# Patient Record
(demographics unavailable — no encounter records)

---

## 2024-11-12 NOTE — IMAGING
[de-identified] : LEFT KNEE Inspection:  mild effusion, pop cyst Palpation: medial joint line tenderness, anterior tenderness Knee Range of Motion:  3-125  Strength: 5/5 Quadriceps strength, 5/5 Hamstring strength Neurological: light touch is intact throughout Ligament Stability and Special Tests:  McMurrays: neg Lachman: neg Pivot Shift: neg Posterior Drawer: neg Valgus: neg Varus: neg Patella Apprehension: neg Patella Maltracking: neg

## 2024-11-12 NOTE — HISTORY OF PRESENT ILLNESS
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 Has tried icing, Tylenol  11/12/24 - CSI last visit 7/9/24 with temp relief. In PT 1x/week at [facility]  but still pain

## 2024-11-12 NOTE — ASSESSMENT
[FreeTextEntry1] :    - We discussed their diagnosis and treatment options at length including the risks and benefits of both surgical treatment with a knee replacement and non-surgical options. Surgical risks include but are not limited to pain, infection, bleeding, vascular injury, numbness, tingling, nerve damage. - Due to risks of surgery, we will continue conservative treatment with PT, icing, and anti-inflammatory medications - The patient was provided with a PT prescription to work on ROM, hip ER/abductors strengthening, quad/hamstring stretches and strengthening, and other exercises - The patient was advised to let pain guide the gradual advancement of activities. - The patient was advised to apply ice (wrapped in a towel or protective covering) to the area daily (20 minutes at a time, 2-4X/day). - We also discussed the possible of a corticosteroid injection in order to help decrease inflammation and pain so that they can perform better therapy. - The risks, benefits, and alternatives to corticosteroid injection were reviewed with the patient and they wished to proceed with this treatment course. - Follow up as needed in 6 weeks to re-evaluate, if no improvement we spoke about possibility of viscosupplementation injections    Medication Discussion: 1) We discussed a comprehensive treatment plan that included possible pharmaceutical management involving the use of prescription strength medications including but not limited to options such as oral Naprosyn 500mg BID, once daily Meloxicam 15 mg, or 500-650 mg Tylenol versus over the counter oral medications in addition to discussing possible topical prescription Pennsaid vs  Voltaren gel. 2) There is a moderate risk of morbidity with further treatment, especially from use of prescription strength medications and possible side effects of these medications which include but are not limited to upset stomach with oral medications, skin reactions to topical medications and GI/cardiac/renal issues with long term use. 3) I recommended that the patient follow-up with their medical physician if there are any significant potential issues with long term medication use such as interactions with current medications or with exacerbation of underlying medical comorbidities. 4) The benefits and risks associated with use of oral and / or topical prescription and over the counter anti-inflammatory medications were discussed with the patient. The patient voiced understanding of the risks including but not limited to bleeding, stroke, kidney dysfunction, heart disease, and were referred to the black box warning label for further information.

## 2025-04-16 NOTE — REASON FOR VISIT
[CV Risk Factors and Non-Cardiac Disease] : CV risk factors and non-cardiac disease [FreeTextEntry3] : Dr. Chapman [FreeTextEntry1] : This is a 55 year year old male here today for follow up cardiac followup evaluation.  He has a past medical history significant for murmur, history of hypertension, hyperlipidemia.  Today he is feeling generally well and does not have any complaints at this time.  He is here for repeat blood work and blood pressure check and is currently prescribed amlodipine 5 mg, atorvastatin 40 mg, and telmisartan 80 mg.  He denies fever, chills, weight loss, malaise, rash, alteration bowel habits, weakness, abdominal pain, bloating, changes in urination, visual disturbances, chest pain, headaches, dizziness, heart palpitations, recent episodes of syncope or falls at this time.    [Follow-Up - Clinic] : a clinic follow-up of [Dyspnea] : dyspnea [Hyperlipidemia] : hyperlipidemia [Hypertension] : hypertension

## 2025-04-16 NOTE — DISCUSSION/SUMMARY
[FreeTextEntry1] : The patient is a 55-year-old male with past medical history significant for murmur, history of hypertension, hyperlipidemia, comes in for followup cardiac evaluation. The patient denies specific chest pain, chest pressure, dizziness or palpitations.  He may get occasional dyspnea on exertion. Coronary artery calcium score done May 23, 2019 demonstrated a score of 3 units in the left anterior descending artery. The patient had a normal exercise stress test April 16, 2023; he has good aerobic capacity. Lipid panel done September 6, 2024 demonstrated cholesterol 177, HDL 55, triglycerides 94, LDL calculated 105, non-HDL cholesterol 122, LDL direct 111 mg/dL and hemoglobin A1c of 5.8.  He will continue on his current diet and exercise program.  He will have new blood work done today for electrolytes and lipid profile. He stopped amlodipine 10 mg daily on his own.  He remains on telmisartan 80 mg daily.  Hypertension. His blood pressure seems to be under adequate control.  He will continue his current diet and salt limitation.  He will return in 3 months to recheck his blood pressure. The patient had normal exercise stress test February 7, 2023.  New blood work done today for lipid profile.  His blood pressure is under good control.  He remains on Lipitor 40 mg daily.  He is no longer taking Zetia therapy. He continues on his Micardis 80 mg daily and Norvasc 5 mg/day for his hypertension. Lifestyle modification was reinforced. I encouraged him to increase his aerobic activity and to work with our registered dietitian. Echo Doppler examination done February 17, 2021 demonstrated minimal mitral valve regurgitation, minimal tricuspid valve regurgitation, physiologic pulmonic valve regurgitation, normal left ventricular ejection fraction. Electrocardiogram done November 17, 2021 demonstrate normal sinus rhythm rate 60 bpm is otherwise unremarkable. The patient had a normal exercise stress test done February 17, 2021. Electrocardiogram done May 27, 2020 demonstrated normal sinus rhythm rate of 68 bpm is otherwise unremarkable. Coronary calcium score May 23rd 2019.  This demonstrated 3 units in the left anterior descending artery and his other arteries were zero.  The patient had a normal exercise stress test 12/12/2018.  The patient understands that aerobic exercises must be increased to 40 minutes 4 times per week. A detailed discussion of lifestyle modification was done today. The patient has a good understanding of the diagnosis, and treatment plan. Lifestyle modification was also outlined.

## 2025-04-16 NOTE — ASSESSMENT
[FreeTextEntry1] : Prior note nurse practitioner Roberto September 17, 2024 this is a 54-year-old male here today for follow up cardiac follow-up evaluation.  He has a past medical history significant for murmur, history of hypertension, hyperlipidemia.  HPI: He is feeling generally well today and denies chest pain, dizziness, heart palpitations, recent episodes of syncope or falls, SOB, or dyspnea at this time.  Current Medications: Amlodipine 10 mg daily, Atorvastatin 40 mg daily, and Telmisartan 80 mg daily.   Patient reports he hand his wife have joined [employer] and work out 3x/week.   Depression screening completed. Discussed negative results with patient during visit. Patient will follow-up with their primary care provider for mental health management if needed in the future.  BLOOD PRESSURE: -Controlled.   -I have discussed the importance of maintaining good BP control and reviewed the newest guidelines with the patient while re-enforcing dietary sodium restrictions to no more than 2-3 g daily, DASH diet, life style modifications as well as the goal of maintaining ideal body weight with the patient today. I have advised the patient to avoid the use of over-the-counter medications/ supplements especially NSAIDS.   BLOOD WORK: -Lipid panel done September 2024 demonstrated triglyceride 94, cholesterol 177, HDL 55, , non-, LDL direct 111. -Lipid panel done November 2023 demonstrated triglyceride 178, cholesterol 206, HDL 60, , non-, LDL direct 119.  -Lipid panel done February 2023 demonstrated triglyceride 406, cholesterol 223, HDL 47, Non-, LDL-direct 129 -Lipid panel done July 2022 demonstrated triglyceride 329, cholesterol 179, HDL 43, LDL 99, Non-, LDL-direct 71  TESTING/REPORTS: -EKG done 09/17/2024 demonstrated regular sinus rhythm rate 61 bpm otherwise unremarkable.   -EKG done November 2023 demonstrated sinus bradycardia heart rate 44 BPM.  *Patient denies dizziness and heart rate reassessment via SpO2 shows rate of 65 BPM*   -Echocardiogram done October 2023 demonstrated ventricular systolic function ejection fraction 64%, minimal thickening of the aortic valve leaflets, physiologic mitral regurgitation, physiologic tricuspid regurgitation, physiologic pulmonic regurgitation.  -Bilateral carotid artery Doppler in October 2023 demonstrated proximal ICA with no stenosis bilaterally.  -The patient had normal exercise stress test February 7, 2023.  -EKG done Jul 05, 2022 which demonstrated regular sinus rhythm with nonspecific ST-T wave changes, BPM of 51.  -The patient had a normal exercise stress test done February 17, 2021.  -The patient had coronary calcium score May 23rd 2019. This demonstrated 3 units in the left anterior descending artery and his other arteries were zero.  -Echocardiogram done on 2/17/2021 demonstrated mild mitral tricuspid and pulmonic regurgitation with normal left ventricular systolic function. EF of 65%.   PLAN: -He will continue with his usual medications and will contact the office if he is having any complaints between now and their next follow up appointment. -He will schedule an exercise stress test to evaluate for significant coronary artery disease. -He will follow-up with his PCP routinely.   I have discussed the plan of care with . BRENDEN BURNETT and he will follow up in 4-6 months. He is compliant with all of his medications.  The patient understands that aerobic exercises must be increased to minutes 4 times/week and a detailed discussion of lifestyle modification was done today.  The patient has a good understanding of the diagnosis, treatment plan and lifestyle modification.  He will contact me at the office for any questions with their care or any changes in their health status.  Dr Thornton, supervising physician, was present in the office with DEMETRICE Mejia NP during the horn portions of the history and exam. The plan was discussed in detail as documented in the note.  DEMETRICE Mejia NP

## 2025-04-16 NOTE — PHYSICAL EXAM
[Well Developed] : well developed [Well Nourished] : well nourished [No Acute Distress] : no acute distress [Normal Venous Pressure] : normal venous pressure [No Carotid Bruit] : no carotid bruit [Normal S1, S2] : normal S1, S2 [No Rub] : no rub [Apical Thrill] : no thrill palpable at the apex [Clear Lung Fields] : clear lung fields [Good Air Entry] : good air entry [No Respiratory Distress] : no respiratory distress  [Soft] : abdomen soft [Non Tender] : non-tender [No Masses/organomegaly] : no masses/organomegaly [Normal Bowel Sounds] : normal bowel sounds [Normal Gait] : normal gait [No Edema] : no edema [No Cyanosis] : no cyanosis [No Clubbing] : no clubbing [No Varicosities] : no varicosities [No Rash] : no rash [No Skin Lesions] : no skin lesions [Moves all extremities] : moves all extremities [No Focal Deficits] : no focal deficits [Normal Speech] : normal speech [Alert and Oriented] : alert and oriented [Normal memory] : normal memory [General Appearance - Well Developed] : well developed [Normal Appearance] : normal appearance [Well Groomed] : well groomed [General Appearance - Well Nourished] : well nourished [No Deformities] : no deformities [General Appearance - In No Acute Distress] : no acute distress [Normal Conjunctiva] : the conjunctiva exhibited no abnormalities [Normal Oral Mucosa] : normal oral mucosa [Normal Jugular Venous A Waves Present] : normal jugular venous A waves present [Normal Jugular Venous V Waves Present] : normal jugular venous V waves present [No Jugular Venous Serna A Waves] : no jugular venous serna A waves [Respiration, Rhythm And Depth] : normal respiratory rhythm and effort [Exaggerated Use Of Accessory Muscles For Inspiration] : no accessory muscle use [Auscultation Breath Sounds / Voice Sounds] : lungs were clear to auscultation bilaterally [Bowel Sounds] : normal bowel sounds [Abdomen Soft] : soft [Abnormal Walk] : normal gait [Gait - Sufficient For Exercise Testing] : the gait was sufficient for exercise testing [Nail Clubbing] : no clubbing of the fingernails [Cyanosis, Localized] : no localized cyanosis [Skin Color & Pigmentation] : normal skin color and pigmentation [Skin Turgor] : normal skin turgor [] : no rash [Oriented To Time, Place, And Person] : oriented to person, place, and time [Impaired Insight] : insight and judgment were intact [Affect] : the affect was normal [Mood] : the mood was normal [No Anxiety] : not feeling anxious [5th Left ICS - MCL] : palpated at the 5th LICS in the midclavicular line [Normal] : normal [No Precordial Heave] : no precordial heave was noted [Normal Rate] : normal [Rhythm Regular] : regular [Normal S1] : normal S1 [Normal S2] : normal S2 [No Gallop] : no gallop heard [S3] : no S3 [S4] : no S4 [Click] : no click [Pericardial Rub] : no pericardial rub [I] : a grade 1 [Right Carotid Bruit] : no bruit heard over the right carotid [Left Carotid Bruit] : no bruit heard over the left carotid [Right Femoral Bruit] : no bruit heard over the right femoral artery [Left Femoral Bruit] : no bruit heard over the left femoral artery [2+] : left 2+ [No Abnormalities] : the abdominal aorta was not enlarged and no bruit was heard [No Pitting Edema] : no pitting edema present

## 2025-04-17 NOTE — HISTORY OF PRESENT ILLNESS
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 Has tried icing, Tylenol  11/12/24 - CSI last visit 7/9/24 with temp relief. In PT 1x/week at [facility]  but still pain 4/17/25- had temp relief from injeciton, doing PT and HEP, worsening pain since march 2025

## 2025-04-17 NOTE — IMAGING
[de-identified] : LEFT KNEE Inspection:  mild effusion, pop cyst Palpation: medial joint line tenderness, anterior tenderness Knee Range of Motion:  3-125  Strength: 5/5 Quadriceps strength, 5/5 Hamstring strength Neurological: light touch is intact throughout Ligament Stability and Special Tests:  McMurrays: neg Lachman: neg Pivot Shift: neg Posterior Drawer: neg Valgus: neg Varus: neg Patella Apprehension: neg Patella Maltracking: neg

## 2025-04-17 NOTE — ASSESSMENT
[FreeTextEntry1] :   - We discussed their diagnosis and treatment options at length including the risks and benefits of both surgical treatment with a knee replacement and non-surgical options. Surgical risks include but are not limited to pain, infection, bleeding, vascular injury, numbness, tingling, nerve damage. - Due to risks of surgery, we will continue conservative treatment with PT, icing, and anti-inflammatory medications - The patient was provided with a PT prescription to work on ROM, hip ER/abductors strengthening, quad/hamstring stretches and strengthening, and other exercises - The patient was advised to let pain guide the gradual advancement of activities. - The patient was advised to apply ice (wrapped in a towel or protective covering) to the area daily (20 minutes at a time, 2-4X/day). - naprosyn rx - Patient was given a prescription for an anti-inflammatory medication.  They will take it for the next week and then on an as needed basis, as long as there are no medical contra-indications.  Patient is counseled on possible GI, renal, and cardiovascular side effects. - Follow up as needed in 6 weeks to re-evaluate, if no improvement we spoke about possibility of viscosupplementation injections

## 2025-05-15 NOTE — HISTORY OF PRESENT ILLNESS
[Lower back] : lower back [de-identified] : Lower back pain.  three weeks ago;  severe but has since gone away x 72 hours;  no night nor rest pain;  no weakness nor sensory loss; OTC meds;  no acu , chiro nor PT ;  no ER or UC visits;  first time occurrence

## 2025-05-15 NOTE — ASSESSMENT
[FreeTextEntry1] : mechanical, benign and self limited.  proposed a short course of PT to be introduced to a core strengthening regimen

## 2025-05-15 NOTE — PHYSICAL EXAM
[Normal Coordination] : normal coordination [Normal DTR UE/LE] : normal DTR UE/LE  [Normal Sensation] : normal sensation [Normal Mood and Affect] : normal mood and affect [Oriented] : oriented [Able to Communicate] : able to communicate [No obvious lymphadenopathy in areas examined] : no obvious lymphadenopathy in areas examined [Well Developed] : well developed [] : light touch intact throughout both lower extremities

## 2025-05-15 NOTE — HISTORY OF PRESENT ILLNESS
[Lower back] : lower back [de-identified] : Lower back pain.  three weeks ago;  severe but has since gone away x 72 hours;  no night nor rest pain;  no weakness nor sensory loss; OTC meds;  no acu , chiro nor PT ;  no ER or UC visits;  first time occurrence

## 2025-05-20 NOTE — REASON FOR VISIT
[Follow-Up] : a follow-up visit [Latent TB/ +PPD/ +IGRA] : latent TB/ +PPD/ +IGRA [TextBox_44] : (+)asthma, (+)allergies, GERD,(+)NADYA, occupational exposure in the workplace

## 2025-05-20 NOTE — ASSESSMENT
[FreeTextEntry1] : Mr. BURNETT is a 55 year old male with a history of  HTN, elevated cholesterol, mitral regurgitation, ?allergies, ?asthma, GERD, intermittent explosive disorder who comes into the office today for pulmonary evaluation for asthma, allergy, and mild OSAS (on DD) - #1 issue is weight (still), ow- improved overall- except weight (still)/lack of exercise  The patient's shortness of breath is multifactorial due to: -pulmonary disease       -?Mild asthma -poor breathing mechanics  -overweight/out of shape -?cardiac disease   Problem 1: ?Mild Asthma (Risks: Occupational exposure in the workplace - 9/11) -continue Breo Ellipta 200 at 1 inhalation daily (stable)  -Asthma is believed to be caused by inherited (genetic) and environmental factor, but its exact cause is unknown. Asthma may be triggered by allergens, lung infections, or irritants in the air. Asthma triggers are different for each person   Problem 2: ?Allergy and Sinus -s/p blood work to include: (-) IgE level, (-)eosinophil level, vitamin D level (low), (+) food IgE level, and asthma profile (+) Environmental measures for allergies were encouraged including mattress and pillow cover, air purifier, and environmental controls.  Problem 2a: Low vitamin D -start taking vitamin D Has been associated with asthma exacerbations and increased allergic symptoms. The goal based on recent information is maintaining levels between 50-70 and low normal is 30. Recommended 50,000 units every two weeks to once a month depending on the level.   Problem 3: GERD -Diet Controlled -Rule of 2s: avoid eating too much, eating too fast, eating too late, eating too spicy, eating too lousy, eating two hours before bed. -Things to avoid including overeating, spicy foods, tight clothing, eating within three hours of bed, this list is not all inclusive.  -For treatment of reflux, possible options discussed including diet control, H2 blockers, PPIs, as well as coating motility agents discussed as treatment options. Timing of meals and proximity of last meal to sleep were discussed. If symptoms persist, a formal gastrointestinal evaluation is needed.  Problem 4: Occupational Exposure in the Workplace (9/11) -complete regular follow ups  Problem 5: (+) NADYA -s/p a home sleep study due to his witnessed apneas, snoring, and fatigue -Oral appliance in place  -s/p sleep study with appliance- no nadya present  Sleep apnea is associated with adverse clinical consequences which an affect most organ systems. Cardiovascular disease risk includes arrhythmias, atrial fibrillation, hypertension, coronary artery disease, and stroke. Metabolic disorders include diabetes type 2, non-alcoholic fatty liver disease. Mood disorder especially depression; and cognitive decline especially in the elderly. Associations with chronic reflux/Jerome's esophagus some but not all inclusive.  -Reasons include arousal consistent with hypopnea; respiratory events most prominent in REM sleep or supine position; therefore sleep staging and body position are important for accurate diagnosis and estimation of AHI.   Problem 6: R/o RLS -s/p to complete: iron studies (WNL), thyroid function test (WNL), free and total testosterone level (low) Restless Legs Syndrome (RLS), also known as Terarzas-Ekbom Disease, is a common sleep -related movement disorder. About 1 in 10 adults in the [place] have problems from restless leg syndrome. It also can be seen in about 2% of children. Women are twice as likely as men to have RLS. People with RLS will have symptoms most often during times when they are less active, especially at bedtime. RLS most often causes an overwhelming urge to move your legs and sometimes other parts of your body. This urge is associated with unpleasant sensations in different parts of the body. The symptoms can be mild to severe and can affect your ability to go to sleep and stay asleep. People with RLS often sleep less at night and feel more tired during the day.   Problem 7: Poor Mechanics of Breathing -Recommended Otilio Palmer and Rebecca breathing techniques  - Proper breathing techniques were reviewed with an emphasis of exhalation. Patient instructed to breath in for 1 second and out for four seconds. Patient was encouraged to not talk while walking.   Problem 8: Overweight (improved) -recommended Berberine OTC -recommended "10-Day Detox" by Arsenio Yen  -Recommend Functional Medicine Evaluation with Dr. Garcia and Keith Yo  -Recommend "Muniq" OTC Supplement for weight loss, energy levels, and blood sugar levels  - Recommended "The Wheat Belly Diet"  -Weight loss, exercise, and diet control were discussed and are highly encouraged. Treatment options were given such as, aqua therapy, and contacting a nutritionist. Recommended to use the elliptical, stationary bike, less use of treadmill. Mindful eating was explained to the patient Obesity is associated with worsening asthma, shortness of breath, and potential for cardiac disease, diabetes, and other underlying medical conditions.   Problem 9: Cardiac -recommended to follow up with a cardiologist (Dr. Thornton)  Problem 10: health maintenance -recommended Epsom salt bath QHS  -continue vitamin B6 -s/p covid 19 vaccine x2 -recommended shringrix vaccine -s/p influenza vaccine 2023 -recommended strep pneumonia vaccines after age 65: Prevnar-13 vaccine, followed by Pneumo vaccine 23 one year following -recommended early intervention for URIs -recommended regular osteoporosis evaluations -recommended early dermatological evaluations -recommended after the age of 50 to the age of 70, colonoscopy every 5 years  -Recommended back stretch routine by Ariel Monaco   F/P in 4-6 months  -he  is recommended to call with any changes, questions, or concerns.

## 2025-05-20 NOTE — ADDENDUM
[FreeTextEntry1] : Documented by Eric Han acting as a scribe for Dr. Baldemar Sun on 05/20/2025. All medical record entries made by the Scribe were at my, Dr. Baldemar Sun's, direction and personally dictated by me on 05/20/2025. I have reviewed the chart and agree that the record accurately reflects my personal performance of the history, physical exam, assessment and plan. I have also personally directed, reviewed, and agree with the discharge instructions.

## 2025-05-20 NOTE — HISTORY OF PRESENT ILLNESS
[TextBox_4] : Mr. BURNETT is a 55 year old male presenting to the office today for follow up pulmonary evaluation. His chief complaint is   -he notes feeling generally well -he notes bowels are regular -he notes good quality of sleep -he notes sleeping for 6 hours a night  -he notes exercising, but not as much as he wants to  -he notes work being very busy at work  -he notes weight is stable -he denies dysphonia -he denies taking any new medications, vitamins, or supplements -he notes arthritis in the left knee -he notes PT for the knee  -he notes back pain for a week, but has been resolved  -he notes appetite is stable -he notes diet is good (gluten free)  -he denies any headaches, nausea, emesis, fever, chills, sweats, chest pain, chest pressure, coughing, wheezing, palpitations, diarrhea, constipation, dysphagia, vertigo, myalgias, leg swelling, itchy eyes, itchy ears, heartburn, reflux, or sour taste in the mouth.

## 2025-05-20 NOTE — ASSESSMENT
[FreeTextEntry1] : Mr. BURNETT is a 55 year old male with a history of  HTN, elevated cholesterol, mitral regurgitation, ?allergies, ?asthma, GERD, intermittent explosive disorder who comes into the office today for pulmonary evaluation for asthma, allergy, and mild OSAS (on DD) - #1 issue is weight (still), ow- improved overall- except weight (still)/lack of exercise  The patient's shortness of breath is multifactorial due to: -pulmonary disease       -?Mild asthma -poor breathing mechanics  -overweight/out of shape -?cardiac disease   Problem 1: ?Mild Asthma (Risks: Occupational exposure in the workplace - 9/11) -continue Breo Ellipta 200 at 1 inhalation daily (stable)  -Asthma is believed to be caused by inherited (genetic) and environmental factor, but its exact cause is unknown. Asthma may be triggered by allergens, lung infections, or irritants in the air. Asthma triggers are different for each person   Problem 2: ?Allergy and Sinus -s/p blood work to include: (-) IgE level, (-)eosinophil level, vitamin D level (low), (+) food IgE level, and asthma profile (+) Environmental measures for allergies were encouraged including mattress and pillow cover, air purifier, and environmental controls.  Problem 2a: Low vitamin D -start taking vitamin D Has been associated with asthma exacerbations and increased allergic symptoms. The goal based on recent information is maintaining levels between 50-70 and low normal is 30. Recommended 50,000 units every two weeks to once a month depending on the level.   Problem 3: GERD -Diet Controlled -Rule of 2s: avoid eating too much, eating too fast, eating too late, eating too spicy, eating too lousy, eating two hours before bed. -Things to avoid including overeating, spicy foods, tight clothing, eating within three hours of bed, this list is not all inclusive.  -For treatment of reflux, possible options discussed including diet control, H2 blockers, PPIs, as well as coating motility agents discussed as treatment options. Timing of meals and proximity of last meal to sleep were discussed. If symptoms persist, a formal gastrointestinal evaluation is needed.  Problem 4: Occupational Exposure in the Workplace (9/11) -complete regular follow ups  Problem 5: (+) NADYA -s/p a home sleep study due to his witnessed apneas, snoring, and fatigue -Oral appliance in place  -s/p sleep study with appliance- no nadya present  Sleep apnea is associated with adverse clinical consequences which an affect most organ systems. Cardiovascular disease risk includes arrhythmias, atrial fibrillation, hypertension, coronary artery disease, and stroke. Metabolic disorders include diabetes type 2, non-alcoholic fatty liver disease. Mood disorder especially depression; and cognitive decline especially in the elderly. Associations with chronic reflux/Jerome's esophagus some but not all inclusive.  -Reasons include arousal consistent with hypopnea; respiratory events most prominent in REM sleep or supine position; therefore sleep staging and body position are important for accurate diagnosis and estimation of AHI.   Problem 6: R/o RLS -s/p to complete: iron studies (WNL), thyroid function test (WNL), free and total testosterone level (low) Restless Legs Syndrome (RLS), also known as Terrazas-Ekbom Disease, is a common sleep -related movement disorder. About 1 in 10 adults in the [place] have problems from restless leg syndrome. It also can be seen in about 2% of children. Women are twice as likely as men to have RLS. People with RLS will have symptoms most often during times when they are less active, especially at bedtime. RLS most often causes an overwhelming urge to move your legs and sometimes other parts of your body. This urge is associated with unpleasant sensations in different parts of the body. The symptoms can be mild to severe and can affect your ability to go to sleep and stay asleep. People with RLS often sleep less at night and feel more tired during the day.   Problem 7: Poor Mechanics of Breathing -Recommended Otilio Palmer and Rebecca breathing techniques  - Proper breathing techniques were reviewed with an emphasis of exhalation. Patient instructed to breath in for 1 second and out for four seconds. Patient was encouraged to not talk while walking.   Problem 8: Overweight (improved) -recommended Berberine OTC -recommended "10-Day Detox" by Arsenio Yen  -Recommend Functional Medicine Evaluation with Dr. Garcia and Keith Yo  -Recommend "Muniq" OTC Supplement for weight loss, energy levels, and blood sugar levels  - Recommended "The Wheat Belly Diet"  -Weight loss, exercise, and diet control were discussed and are highly encouraged. Treatment options were given such as, aqua therapy, and contacting a nutritionist. Recommended to use the elliptical, stationary bike, less use of treadmill. Mindful eating was explained to the patient Obesity is associated with worsening asthma, shortness of breath, and potential for cardiac disease, diabetes, and other underlying medical conditions.   Problem 9: Cardiac -recommended to follow up with a cardiologist (Dr. Thornton)  Problem 10: health maintenance -recommended Epsom salt bath QHS  -continue vitamin B6 -s/p covid 19 vaccine x2 -recommended shringrix vaccine -s/p influenza vaccine 2023 -recommended strep pneumonia vaccines after age 65: Prevnar-13 vaccine, followed by Pneumo vaccine 23 one year following -recommended early intervention for URIs -recommended regular osteoporosis evaluations -recommended early dermatological evaluations -recommended after the age of 50 to the age of 70, colonoscopy every 5 years  -Recommended back stretch routine by Ariel Monaco   F/P in 4-6 months  -he  is recommended to call with any changes, questions, or concerns.

## 2025-05-20 NOTE — REASON FOR VISIT
Please notify her care giver of her test results  The one liver test has improved, but remains slightly high.  Rest of liver testing normal  Vit D is in normal range.  Would not supplement more then 4000 international units in total /day    Cholesterol is high.  She is at increase risk to develop ASCVD.  Would advise start of a statin medication like atorvastatin 10 mg once nightly.  Would repeat labs to monitor liver function on statin and cholesterol 3 months.  If she complains of increase muscle pain to call the office.  This is a potential side effect of the statin medication.  If agrees please send atorvastatin 10 mg 90 1 refill to her pharmacy  Results for JA GREER (MRN [id number]) as of 5/18/2021 17:53   Ref. Range 5/12/2021 09:42   Sodium Latest Ref Range: 135 - 145 mmol/L 141   Potassium Latest Ref Range: 3.4 - 5.1 mmol/L 3.9   Chloride Latest Ref Range: 98 - 107 mmol/L 108 (H)   CO2 Latest Ref Range: 21 - 32 mmol/L 29   ANION GAP Latest Ref Range: 10 - 20 mmol/L 8 (L)   Glucose Latest Ref Range: 65 - 99 mg/dL 83   BUN Latest Ref Range: 6 - 20 mg/dL 11   Creatinine Latest Ref Range: 0.51 - 0.95 mg/dL 0.88   Glomerular Filtration Rate Latest Ref Range: >90 mL/min/1.73m2 81 (L)   BUN/CREATININE RATIO Latest Ref Range: 7 - 25  13   CALCIUM Latest Ref Range: 8.4 - 10.2 mg/dL 9.3   TOTAL BILIRUBIN Latest Ref Range: 0.2 - 1.0 mg/dL 0.3   AST/SGOT Latest Ref Range: <=37 Units/L 17   ALT/SGPT Latest Ref Range: <64 Units/L 23   ALK PHOSPHATASE Latest Ref Range: 45 - 117 Units/L 128 (H)   GGT Latest Ref Range: 5 - 55 Units/L 31   Albumin Latest Ref Range: 3.6 - 5.1 g/dL 3.8   GLOBULIN Latest Ref Range: 2.0 - 4.0 g/dL 4.2 (H)   A/G Ratio, Serum Latest Ref Range: 1.0 - 2.4  0.9 (L)   TOTAL PROTEIN Latest Ref Range: 6.4 - 8.2 g/dL 8.0   Fasting Status Latest Units: Hours 0   CHOLESTEROL Latest Ref Range: <=199 mg/dL 274 (H)   CHOL/HDL Latest Ref Range: <=4.4  5.4 (H)   HDL Latest Ref Range: >=50 mg/dL 51    CALCULATED LDL Latest Ref Range: <=129 mg/dL 195 (H)   CALCULATED NON HDL Latest Units: mg/dL 223   TRIGLYCERIDE Latest Ref Range: <=149 mg/dL 139   VITAMIND, 25 HYDROXY Latest Ref Range: 30.0 - 100.0 ng/mL 94.4   TSH Latest Ref Range: 0.350 - 5.000 mcUnits/mL 0.587      [Follow-Up] : a follow-up visit [Latent TB/ +PPD/ +IGRA] : latent TB/ +PPD/ +IGRA [TextBox_44] : (+)asthma, (+)allergies, GERD,(+)NADYA, occupational exposure in the workplace

## 2025-05-20 NOTE — PROCEDURE
[FreeTextEntry1] : PFTs revealed normal flows; FEV1 was 3.79 L, which is 127% of predicted; normal flow volume loop.  PFTs were performed to evaluate for    FENO was 10; a normal value being less than 25 Fractional exhaled nitric oxide (FENO) is regarded as a simple, noninvasive method for assessing eosinophilic airway inflammation. Produced by a variety of cells within the lung, nitric oxide (NO) concentrations are generally low in healthy individuals. However, high concentrations of NO appear to be involved in nonspecific host defense mechanisms and chronic inflammatory diseases such as asthma. The American Thoracic Society (ATS) therefore has recommended using FENO to aid in the diagnosis and monitoring of eosinophilic airway inflammation and asthma, and for identifying steroid responsive individuals whose chronic respiratory symptoms may be caused by airway inflammation.   The American Thoracic Society (ATS) strongly recommends the use of FeNO measurement to aid in the assessment, management, and long-term monitoring of asthma. In their 2011 clinical practice guideline, the ATS emphasizes the importance of using FeNO.